# Patient Record
(demographics unavailable — no encounter records)

---

## 2018-06-07 NOTE — XRAY REPORT
PELVIS AND RIGHT HIP:  06/07/2018

 

HISTORY:  Pain.

 

COMPARISON:  Pelvic CT 03/01/2010.

 

FINDINGS:  The hip joints are well maintained.  There is no fracture or malalignment.  

Sacroiliac joints and symphysis pubis are unremarkable.  Mild degenerative change in the lower 

lumbar spine.

 

IMPRESSION:  ESSENTIALLY NEGATIVE PELVIS AND RIGHT HIP X-RAY.

 

 

DD: 06/07/2018 14:26

TD: 06/07/2018 14:31

Job #: [id number]

## 2018-11-27 NOTE — ULTRASOUND REPORT
Reason:  CVA with right occipital infarction

Procedure Date:  11/27/2018   

Accession Number:  [id number] / [id number]                    

Procedure:  US  - Carotid Doppler Complete CPT Code:  

 

FULL RESULT:

 

 

EXAM:

BILATERAL CAROTID AND VERTEBRAL ARTERY DUPLEX DOPPLER ULTRASOUND:

 

EXAM DATE: 11/27/2018 01:33 PM

 

CLINICAL HISTORY: Stroke.

 

COMPARISON: None.

 

TECHNIQUE: Grayscale imaging, color Doppler, and duplex spectral Doppler 

were used to evaluate the carotid and vertebral arteries bilaterally. 

Static images were obtained.

 

FINDINGS: No significant plaque is identified in the right or left common 

or internal carotid arteries. Normal antegrade flow is present in 

bilateral vertebral arteries.

 

VELOCITIES (cm/sec):

 

Right:

RCCA Mid: PSV 61.6 cm/sec.

RCCA Dist: PSV 50.4 cm/sec.

MARIAN Prox: PSV 47.6 cm/sec, EDV 18.7 cm/sec.

MARIAN Mid: PSV 75.5 cm/sec, EDV 27.7 cm/sec.

MARIAN Dist: PSV 90.4 cm/sec, EDV 36.5 cm/sec

RECA: .6 cm/sec.

RVA: PSV 34.4 cm/sec. RVA flow direction: Antegrade.

ICA/CCA: 1.47

 

Left:

LCCA Mid: PSV 55.5 cm/sec.

LCCA Dist: PSV 58.5 cm/sec.

LICA Prox: PSV 58.8 cm/sec, EDV 25.8 cm/sec.

LICA Mid: PSV 82.3 cm/sec, EDV 31.9 cm/sec.

LICA Dist: PSV 60.5 cm/sec, EDV 21.8 cm/sec

LECA: PSV 49.3 cm/sec.

LVA: PSV 48.7 cm/sec. RVA flow direction: Antegrade.

ICA/CCA: 1.40

 

ICA diameter stenosis:

Right: <50% by velocity and <70% by NASCET criteria.

Left: <50% by velocity and <70% by NASCET criteria.

IMPRESSION:

1. There is mild bilateral carotid artery plaquing.

2. In the right carotid artery there are no elevated carotid artery 

velocities to suggest hemodynamically significant stenosis.

3. In the left carotid artery there are no elevated carotid artery 

velocities to suggest hemodynamically significant stenosis.

4. Normal antegrade flow is present in bilateral vertebral arteries.

 

General Recommendations:

 

Stenosis =50% ICA - Follow-up ultrasound 6-12 months

Stenosis <50% ICA - High Risk Patient with plaque - Follow-up ultrasound 

1-2 years

Normal Study but High Risk Patient - Follow-up ultrasound 3-5 years

 

Management recommendations and diagnostic criteria are based on current 

IAC endorsed standards in Carotid Artery Stenosis: Grayscale and Doppler 

Ultrasound Diagnosis.

Validated velocity measurements with angiographic measurements and 

velocity criteria are extrapolated from diameter data as defined by the 

Society of Radiologists in Ultrasound Consensus Conference Radiology 

2003; 229;340-346.

 

[facility]IA

## 2018-11-27 NOTE — ED PHYSICIAN DOCUMENTATION
History of Present Illness





- Stated complaint


Stated Complaint: VISION CHANGES/ALOC





- Chief complaint


Chief Complaint: Neuro





- History obtained from


History obtained from: Patient, Family





- History of Present Illness


Timing: How many weeks ago (1)





- Additonal information


Additional information: 





intermittent visual changes with difficulty concentrating and a lot of stress at

home. She reports difficulty concentrating and especially trying to read. 





Review of Systems


Constitutional: reports: Fatigue.  denies: Fever, Chills, Myalgias


Eyes: reports: Other (difficutly with reading/concentration).  denies: Loss of 

vision, Decreased vision, Photophobia, Discharge, Irritation


Nose: denies: Rhinorrhea / runny nose, Congestion


Throat: denies: Sore throat


Cardiac: denies: Chest pain / pressure, Palpitations


Respiratory: denies: Dyspnea, Cough


GI: denies: Abdominal Pain, Nausea, Vomiting


: denies: Dysuria, Frequency


Skin: denies: Rash


Musculoskeletal: denies: Neck pain, Back pain, Extremity pain


Neurologic: reports: Confused.  denies: Generalized weakness, Focal weakness, 

Numbness





PD PAST MEDICAL HISTORY





- Present Medications


Home Medications: 


                                Ambulatory Orders











 Medication  Instructions  Recorded  Confirmed


 


Atorvastatin [Lipitor] 20 mg PO DAILY #30 tablet 11/27/18 


 


Ipratropium/Albuterol [Duoneb] QID PRN 11/27/18 














- Allergies


Allergies/Adverse Reactions: 


                                    Allergies











Allergy/AdvReac Type Severity Reaction Status Date / Time


 


No Known Drug Allergies Allergy   Verified 11/27/18 08:53














PD ED PE NORMAL





- Vitals


Vital signs reviewed: Yes (hypertensive)





- General


General: Alert and oriented X 3, No acute distress, Well developed/nourished





- HEENT


HEENT: Atraumatic, PERRL, EOMI, Moist mucous membranes, Pharynx benign, 

Dentition benign





- Neck


Neck: Supple, no meningeal sign, No bony TTP





- Cardiac


Cardiac: RRR, No murmur





- Respiratory


Respiratory: No respiratory distress, Clear bilaterally





- Abdomen


Abdomen: Soft, Non tender





- Back


Back: No CVA TTP, No spinal TTP





- Derm


Derm: Normal color, Warm and dry, No rash





- Extremities


Extremities: No deformity, No edema





- Neuro


Neuro: Alert and oriented X 3, CNs 2-12 intact, No motor deficit, No sensory 

deficit, Normal speech


Eye Opening: Spontaneous


Motor: Obeys Commands


Verbal: Oriented


GCS Score: 15





- Psych


Psych: Normal mood, Normal affect





Results





- Vitals


Vitals: 


                               Vital Signs - 24 hr











  11/27/18





  08:47


 


Temperature 36.6 C


 


Heart Rate 84


 


Respiratory 18





Rate 


 


Blood Pressure 195/98 H


 


O2 Saturation 96








                                     Oxygen











O2 Source                      Room air

















- EKG (time done)


  ** 1435


Rate: Rate (enter#) (64)


Rhythm: NSR


Ischemia: Q waves


Compare to prior EKG: Old EKG unavailable


Computer interpretation: Agree with computer





- Labs


Labs: 


                                Laboratory Tests











  11/27/18 11/27/18 11/27/18





  10:32 10:32 10:32


 


WBC  6.4  


 


RBC  4.13 L  


 


Hgb  13.8  


 


Hct  39.0  


 


MCV  94.5  


 


MCH  33.3 H  


 


MCHC  35.2  


 


RDW  13.6  


 


Plt Count  476 H  


 


MPV  7.2 L  


 


Neut # (Auto)  3.6  


 


Lymph # (Auto)  2.1  


 


Mono # (Auto)  0.5  


 


Eos # (Auto)  0.1  


 


Baso # (Auto)  0.2 H  


 


Absolute Nucleated RBC  0.00  


 


Nucleated RBC %  0.1  


 


Sodium   134 L 


 


Potassium   3.6 


 


Chloride   98 L 


 


Carbon Dioxide   28 


 


Anion Gap   8.0 


 


BUN   8 


 


Creatinine   0.6 


 


Estimated GFR (MDRD)   100 


 


Glucose   108 H 


 


Calcium   8.4 L 


 


Total Bilirubin   0.4 


 


AST   21 


 


ALT   18 


 


Alkaline Phosphatase   66 


 


Troponin I    < 0.04


 


Total Protein   6.6 L 


 


Albumin   3.5 


 


Globulin   3.1 


 


Albumin/Globulin Ratio   1.1 


 


Lipase   38 


 


Urine Color   


 


Urine Clarity   


 


Urine pH   


 


Ur Specific Gravity   


 


Urine Protein   


 


Urine Glucose (UA)   


 


Urine Ketones   


 


Urine Occult Blood   


 


Urine Nitrite   


 


Urine Bilirubin   


 


Urine Urobilinogen   


 


Ur Leukocyte Esterase   


 


Ur Microscopic Review   


 


Urine Culture Comments   














  11/27/18





  12:19


 


WBC 


 


RBC 


 


Hgb 


 


Hct 


 


MCV 


 


MCH 


 


MCHC 


 


RDW 


 


Plt Count 


 


MPV 


 


Neut # (Auto) 


 


Lymph # (Auto) 


 


Mono # (Auto) 


 


Eos # (Auto) 


 


Baso # (Auto) 


 


Absolute Nucleated RBC 


 


Nucleated RBC % 


 


Sodium 


 


Potassium 


 


Chloride 


 


Carbon Dioxide 


 


Anion Gap 


 


BUN 


 


Creatinine 


 


Estimated GFR (MDRD) 


 


Glucose 


 


Calcium 


 


Total Bilirubin 


 


AST 


 


ALT 


 


Alkaline Phosphatase 


 


Troponin I 


 


Total Protein 


 


Albumin 


 


Globulin 


 


Albumin/Globulin Ratio 


 


Lipase 


 


Urine Color  DARK YELLOW


 


Urine Clarity  CLEAR


 


Urine pH  6.5


 


Ur Specific Gravity  1.020


 


Urine Protein  NEGATIVE


 


Urine Glucose (UA)  NEGATIVE


 


Urine Ketones  TRACE


 


Urine Occult Blood  NEGATIVE


 


Urine Nitrite  NEGATIVE


 


Urine Bilirubin  NEGATIVE


 


Urine Urobilinogen  0.2 (NORMAL)


 


Ur Leukocyte Esterase  NEGATIVE


 


Ur Microscopic Review  NOT INDICATED


 


Urine Culture Comments  NOT INDICATED














- Rads (name of study)


  ** CT head without


Radiology: Prelim report reviewed (Impression: Posterior distribution 

hypodensity in the right temporoparietal/occipital distribution is most 

concerning for subacute infarction though a nonvisualized mass with surrounding 

edema represents a lice likely differential diagnosis.  There is no substantial 

mass-effect or calcification to suggest an underlying mass.), Final report rece

ived, Discussed with rads, EMP read indepedently, See rad report





  ** doppler carotid


Radiology: Prelim report reviewed (Impression: 1.  There is mild bilateral 

carotid artery plaquing. 2  In the right carotid artery there are no elevated 

carotid artery velocities to suggest hemodynamically significant stenosis. 3 In 

the left carotid artery there are no elevated carotid artery velocities to 

suggest hemodynamically significant stenosis. 4  Normal antegrade flow is 

present in bilateral vertebral arteries.), EMP read indepedently, See rad report





Procedures





- IVC sono (time)


  ** 1008


Bedside IVC sono: IVC measures (cm) (1.39), IVC collapsed c insp (cm) 

(complete), Dehydration (mild est at 1 liter.)





PD MEDICAL DECISION MAKING





- ED course


Complexity details: reviewed results, re-evaluated patient, considered 

differential, d/w patient, d/w family


ED course: 





67 y/o female with one week history of "vision problems" with trouble 

concentrating and difficulty reading appears to have had a CVA in the occipital 

distribution. She is outside the window of opportunity. My initial reaction was 

to admit this patient to the hospital to complete a stroke work up and after 

discussing this completed stroke with our hospitalist, Charity recommended 

obtaining the carotid doppler here today and the remainder of the work up as an 

outpatient and of course treatment with aspirin and a statin. I was in agreement

 with this approach and the patient preferred this as well. She does have a 

primary in our system and out patient work up should not be delayed to include 

the echo and MRI of the head. She will need referral to a neurologist for follow

 up as well. 





Departure





- Departure


Disposition: 01 Home, Self Care


Clinical Impression: 


Cerebrovascular accident (CVA)


Qualifiers:


 CVA mechanism: unspecified Qualified Code(s): I63.9 - Cerebral infarction, 

unspecified





Condition: Stable


Instructions:  ED Stroke Completed


Follow-Up: 


Venancio Lazaro MD [Primary Care Provider] - 


Prescriptions: 


Atorvastatin [Lipitor] 20 mg PO DAILY #30 tablet


Forms:  Activity restrictions

## 2018-11-27 NOTE — CT REPORT
Reason:  vision changes/disorientation

Procedure Date:  11/27/2018   

Accession Number:  [id number] / [id number]                    

Procedure:  CT  - Head W/O CPT Code:  

 

FULL RESULT:

 

 

EXAM:

CT HEAD WITHOUT CONTRAST

 

EXAM DATE: 11/27/2018 10:43 AM.

 

CLINICAL HISTORY: Vision changes/disorientation.

 

COMPARISON: None.

 

TECHNIQUE: Multiaxial CT images were obtained from the foramen magnum to 

the vertex. Reformats: Sagittal and coronal. IV contrast: None.

 

In accordance with CT protocol optimization, one or more of the following 

dose reduction techniques were utilized for this exam: automated exposure 

control, adjustment of mA and/or KV based on patient size, or use of 

iterative reconstructive technique.

 

FINDINGS:

Parenchyma: Hypodensity in a right posterior parietotemporal-occipital 

distribution is suspicious for subacute infarction. No intraparenchymal 

hemorrhage. No evidence of significant mass or midline shift. Gray-white 

differentiation is distinct.

 

Extraaxial Spaces: Normal for age. No subdural or epidural collections 

identified.

 

Ventricles: Normal in size and position.

 

Sinuses and Orbits: Imaged paranasal sinuses, orbits, and mastoids show 

no significant abnormality.

 

Bones: No evidence of fracture or calvarial defect.

 

Other: None.

IMPRESSION: Posterior distribution hypodensity in the right 

temporoparietal-occipital distribution is most concerning for subacute 

infarction though a nonvisualized mass with surrounding edema represents 

a less likely differential diagnosis. There is no substantial mass effect 

or calcification to suggest an underlying mass.

 

[facility] CRITICAL RESULT: The findings were discussed with Dr. Perez on 

11/27/2018 at 11:50 AM.

## 2020-03-17 NOTE — XRAY REPORT
Reason:  COUGH, COPD

Procedure Date:  03/17/2020   

Accession Number:  [id number] / [id number]                    

Procedure:  WCP - Chest 2 View X-Ray CPT Code:  07896

 

***Final Report***

 

 

FULL RESULT:

 

 

EXAM:

CHEST RADIOGRAPHY

 

EXAM DATE: 3/17/2020 03:09 PM.

 

CLINICAL HISTORY: Cough, COPD.

 

COMPARISON: XR CHEST PA AND LAT 05/31/2011 3:20 PM.

 

TECHNIQUE: 2 views.

 

FINDINGS:

Lungs/Pleura: Hyperinflation. Development of infiltrate along the lateral 

right upper and lower lung, with infiltrate along the major fissure and 

the posterior costophrenic angle.

 

The left lung appears clear. Hyperinflation. No pleural fluid collections.

 

Mediastinum: Heart and mediastinal contours are unremarkable.

 

Other: None.

IMPRESSION: Development of lateral right sided infiltrate. Followup to 

resolution suggested.

 

[facility]

## 2020-03-20 NOTE — XRAY REPORT
Reason:  COUGH,DIVERTICULOSIS,ASYMPTOMATIC

Procedure Date:  03/20/2020   

Accession Number:  [id number] / [id number]                    

Procedure:  XR  - Chest 2 View X-Ray CPT Code:  70144

 

***Final Report***

 

 

FULL RESULT:

 

 

EXAM:

CHEST RADIOGRAPHY

 

EXAM DATE: 3/20/2020 09:10 AM.

 

CLINICAL HISTORY: Cough, diverticulosis, asymptomatic.

 

COMPARISON: CHEST 2 VIEW 03/17/2020 2:34 PM.

 

TECHNIQUE: 2 views.

 

FINDINGS:

Lungs/Pleura: No significant change in areas of dense consolidation 

predominantly in the lateral right upper lobe and lateral segment of the 

right middle lobe.

 

Development of a subtle area of opacification laterally in the left upper 

lobe. Stable moderate hyperinflation. No pleural fluid collections.

 

Mediastinum: No cardiomegaly. Stable appearance of the mediastinal 

silhouette.

 

Other: None.

IMPRESSION:

No significant change in areas of dense consolidation laterally in the 

right lung. Subtle infiltrative process also suggested laterally in the 

left midlung field. Follow-up to resolution suggested.

 

[facility]

## 2020-05-13 NOTE — CT REPORT
Reason:  L sided facial droop, L neck pain

Procedure Date:  05/13/2020   

Accession Number:  [id number] / [id number]                    

Procedure:  CT  - ANGIO NECK W CPT Code:  

 

***Final Report***

 

 

FULL RESULT:

 

 

EXAM:

CT ANGIOGRAM NECK

 

EXAM DATE: 05/13/2020 07:09 AM.

 

CLINICAL HISTORY: Headache, nausea and left facial droop. Neck pain.

 

COMPARISON: None.

 

TECHNIQUE: Routine axial helical imaging was performed from the skull 

base through the aortic arch. Reconstructions: Routine multiplanar 3D MIP 

reconstructions. IV Contrast: 80 mL Optiray 320. Evaluation of arterial 

stenosis is based on a NASCET method of measurement.

 

In accordance with CT protocol optimization, one or more of the following 

dose reduction techniques were utilized for this exam: automated exposure 

control, adjustment of mA and/or KV based on patient size, or use of 

iterative reconstructive technique.

 

FINDINGS:

Probable mild apical pulmonary parenchymal scarring with minimal 

emphysema. Moderate to severe chronic multilevel hypertrophic 

degenerative cervical spinal spondylosis including especially severe 

facet arthropathy on the left at C4-C5. Mild broad-based cervical 

dextroscoliosis. Minimal degenerative C4 on C5 anterolisthesis. Slight 

degenerative C5 on C6 retrolisthesis is also present.

 

Along the right side of the upper mediastinal esophagus is an ovoid 

region of soft tissue density measuring up to 11 x 19 mm transverse on 

image 130 of series 2, likely an abnormally enlarged lymph node.

 

Mild to moderate chronic atherosclerotic calcifications at the top of the 

aortic arch. The great vessel origins are patent.

 

Mild to moderate atherosclerosis at the cervical carotid bifurcations. 

Allowing for motion artifact there is, however, no evidence of acute 

abnormality or focal flow-limiting stenosis of the cervical carotid 

arteries, including in the proximal cervical ICA segments.

 

Unremarkable appearance of the right cervical vertebral artery.

 

Mild atherosclerotic luminal stenosis at the origin of the left cervical 

vertebral artery which shows no other evidence of flow-limiting stenosis 

or acute abnormality in the neck.

IMPRESSION:

1. Multifocal large-artery atherosclerosis with calcified and 

noncalcified plaque but no evidence of acute abnormality or flow-limiting 

stenosis of the cervical vertebral or carotid arteries.

2. Abnormally enlarged lymph node in the upper mediastinum on the right 

side of the esophagus.

3. Prominent chronic multilevel hypertrophic degenerative cervical spinal 

spondylosis. Especially severe hypertrophic facet arthropathy on the left 

at C4-C5.

 

RAD[facility]

## 2020-05-13 NOTE — CT REPORT
Reason:  L sided facial droop

Procedure Date:  05/13/2020   

Accession Number:  [id number] / [id number]                    

Procedure:  CT  - ANGIO HEAD W/WO CPT Code:  

 

***Final Report***

 

 

FULL RESULT:

 

 

EXAM:

CT ANGIOGRAM HEAD.

CT SCAN OF THE HEAD WITHOUT AND WITH CONTRAST.

 

EXAM DATE: 5/13/2020 07:09 AM

 

CLINICAL HISTORY: Left facial droop, headache and nausea.

 

COMPARISON: HEAD W/O 11/27/2018 10:28 AM.

 

TECHNIQUE:

- CT Scan Head: Using a multidetector scanner, axial images were acquired 

from the foramen magnum to the skull vertex prior to and following 

contrast administration.

- CT Angiogram: Using a multidetector scanner, high-resolution axial 

images were acquired from the skull base through vertex following rapid 

infusion of intravenous contrast. Reformats: Multiplanar MIP reformats 

were reconstructed. NASCET criteria used for stenosis measurement.

 

IV Contrast: 80 mL Optiray 320.

 

In accordance with CT protocol optimization, one or more of the following 

dose reduction techniques were utilized for this exam: automated exposure 

control, adjustment of mA and/or KV based on patient size, or use of 

iterative reconstructive technique.

 

FINDINGS:

Brain CT without IV contrast:

Again seen are findings of a now large chronic infarct in the right PCA 

vascular territory involving the medial right occipital lobe. This has a 

chronic appearance and now measures about 6 x 2.5 cm transverse.

 

No CT evidence of acute hemorrhage or acute ischemic infarct, aspects 10. 

No hydrocephalus or midline shift.

 

Probable mild inferior right mastoid mucosal thickening. Small left 

anterior maxillary retention cyst. No acute calvarial defect.

 

Brain CT with IV contrast:

No abnormal enhancement.

 

Head CT angiogram:

Mild chronic punctate calcifications of the left intradural vertebral 

artery. No vertebrobasilar insufficiency. No acute abnormality or 

flow-limiting stenosis of the intradural vertebral arteries or basilar 

artery.

 

More likely chronic than acute segmental occlusion of the proximal right 

posterior cerebral artery, on the same side as the large old right PCA 

territory infarct.

 

Patent distal internal carotid arteries with moderately prominent 

multifocal chronic atherosclerotic calcifications of the proximal pair 

clinoid and cavernous ICA segments.

 

No proximal flow-limiting stenosis or occlusion of the anterior cerebral 

arteries, middle cerebral arteries or left posterior cerebral artery. No 

evidence for aneurysm of the [place] or major dural venous sinus 

obstructive thrombus.

IMPRESSION:

CT Head:

1. No CT evidence of acute intracranial abnormality.

2. Large old right PCA territory infarct of the medial right occipital 

lobe.

3. No abnormal enhancement.

 

CTA Head:

1. More likely chronic than acute segmental occlusion of the proximal 

right PCA on the same side as previous infarct.

2. No other evidence for intracranial acute large vessel occlusion or 

aneurysm.

 

Note: The above findings do not preclude the possibility of small or 

early ischemic infarct for which brain MRI is more sensitive.

 

RADIA

## 2020-05-13 NOTE — ED PHYSICIAN DOCUMENTATION
PD HPI HEADACHE





- Stated complaint


Stated Complaint: POSS STROKE





- Chief complaint


Chief Complaint: General





- History obtained from


History obtained from: Patient, EMS





- History of Present Illness


Timing - onset: Enter  time (610), Today


Timing - onset during: Light activity


Timing - duration: Minutes


Timing - details: Abrupt onset, Still present


Location: [facility]


Quality: Throbbing


Associated symptoms: Nausea, Vision changes.  No: Fever, Stiff neck, Vomiting, 

Weakness, Numbness, Syncope, Seizure, Eye pain


Improved by: Rest


Contributing factors: Hypertension.  No: Anticoagulated, Possible carbon 

monoxide, Recent illness, Trauma


Similar symptoms before: Diagnosis (CAV with resultant hemianopsia)


Recently seen: Not recently seen





- Additional information


Additional information: 


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 

hemianopsia was driving on her way to work this morning when she developed 

bright flashing lights in the periphery of her vision and subsequent to that she

developed nausea and a headache she felt confused she pulled over onto the side 

of the road and was brought to the hospital by ambulance.








Review of Systems


Constitutional: denies: Fever, Chills, Myalgias, Fatigue


Eyes: reports: Loss of vision (to the left lateral visual field-- pre existing),

Photophobia (mild)


Ears: denies: Ear pain


Nose: denies: Rhinorrhea / runny nose, Congestion


Throat: denies: Sore throat


Cardiac: denies: Chest pain / pressure, Palpitations


Respiratory: denies: Dyspnea, Cough


GI: reports: Nausea.  denies: Abdominal Pain, Vomiting


: denies: Dysuria, Frequency


Skin: denies: Rash


Musculoskeletal: denies: Neck pain, Back pain, Extremity pain


Neurologic: reports: Altered mental status, Headache.  denies: Generalized 

weakness, Focal weakness, Numbness, Difficulty speaking, Head injury, LOC





PD PAST MEDICAL HISTORY





- Past Medical History


Past Medical History: Yes


Respiratory: COPD


Neuro: CVA


Endocrine/Autoimmune: None


GI: Diverticulitis


GYN: None


: None


HEENT: Other


Psych: None


Musculoskeletal: None


Derm: None





- Past Surgical History


Past Surgical History: Yes


General: Appendectomy


/GYN:  section


HEENT: Cataracts, Tonsil/Adenoidectomy





- Present Medications


Home Medications: 


                                Ambulatory Orders











 Medication  Instructions  Recorded  Confirmed


 


Atorvastatin [Lipitor] 20 mg PO DAILY #30 tablet 18 


 


Ipratropium/Albuterol [Duoneb] QID PRN 18 


 


Levetiracetam [Keppra] 250 mg PO BID #40 tablet 20 














- Allergies


Allergies/Adverse Reactions: 


                                    Allergies











Allergy/AdvReac Type Severity Reaction Status Date / Time


 


No Known Drug Allergies Allergy   Verified 20 06:37














- Social History


Does the pt smoke?: Yes


Smoking Status: Current every day smoker


Does the pt drink ETOH?: Yes


Does the pt have substance abuse?: No





- Immunizations


Immunizations are current?: Yes





- POLST


Patient has POLST: No





PD ED PE NORMAL





- Vitals


Vital signs reviewed: Yes (hypertensive)





- General


General: Alert and oriented X 3, No acute distress, Well developed/nourished





- HEENT


HEENT: Atraumatic, PERRL, EOMI





- Neck


Neck: Supple, no meningeal sign, No bony TTP





- Cardiac


Cardiac: RRR, No murmur





- Respiratory


Respiratory: No respiratory distress, Clear bilaterally





- Abdomen


Abdomen: Normal bowel sounds, Soft, Non tender, Non distended, No organomegaly





- Back


Back: No CVA TTP, No spinal TTP





- Derm


Derm: Normal color, Warm and dry, No rash





- Extremities


Extremities: No deformity, No edema





- Neuro


Neuro: Alert and oriented X 3, CNs 2-12 intact, No motor deficit, Normal speech,

 Other (There is a left hemianopsia)


Eye Opening: Spontaneous


Motor: Obeys Commands


Verbal: Oriented


GCS Score: 15





- Psych


Psych: Normal affect, Other (mood is anxious)





Results





- Vitals


Vitals: 


                               Vital Signs - 24 hr











  20





  06:32 06:37 06:50


 


Temperature 37.3 C  


 


Heart Rate 83  


 


Respiratory 17 17 18





Rate   


 


Blood Pressure 174/81 H  


 


O2 Saturation 94  














  20





  07:09 07:57 09:01


 


Temperature  36.8 C 


 


Heart Rate  80 106 H


 


Respiratory 17 18 17





Rate   


 


Blood Pressure  152/71 H 152/72 H


 


O2 Saturation  95 98














  20





  11:18 13:40


 


Temperature  


 


Heart Rate 93 93


 


Respiratory 18 18





Rate  


 


Blood Pressure 140/73 H 140/73 H


 


O2 Saturation 99 99








                                     Oxygen











O2 Source                      Room air

















- EKG (time done)


  ** 0644


Rate: Rate (enter#) (82)


Rhythm: NSR


Ischemia: Normal ST segments


Compare to prior EKG: Unchanged from prior EKG (SPT 2018)


Computer interpretation: Agree with computer





- Labs


Labs: 


                                Laboratory Tests











  20





  07:22 07:22 07:22


 


WBC  7.7  


 


RBC  3.98 L  


 


Hgb  12.3  


 


Hct  38.1  


 


MCV  95.7  


 


MCH  30.9  


 


MCHC  32.3  


 


RDW  14.3  


 


Plt Count  259  


 


MPV  9.2  


 


Neut # (Auto)  5.7  


 


Lymph # (Auto)  1.4 L  


 


Mono # (Auto)  0.5  


 


Eos # (Auto)  0.2  


 


Baso # (Auto)  0.1  


 


Absolute Nucleated RBC  0.00  


 


Nucleated RBC %  0.0  


 


PT   11.3 


 


INR   1.0 


 


APTT   28.3 


 


Sodium    132 L


 


Potassium    4.2


 


Chloride    101


 


Carbon Dioxide    25


 


Anion Gap    6.0


 


BUN    14


 


Creatinine    0.5


 


Estimated GFR (MDRD)    123


 


Glucose    118 H


 


Calcium    8.3 L


 


Urine Color   


 


Urine Clarity   


 


Urine pH   


 


Ur Specific Gravity   


 


Urine Protein   


 


Urine Glucose (UA)   


 


Urine Ketones   


 


Urine Occult Blood   


 


Urine Nitrite   


 


Urine Bilirubin   


 


Urine Urobilinogen   


 


Ur Leukocyte Esterase   


 


Ur Microscopic Review   


 


Urine Culture Comments   














  20





  08:45


 


WBC 


 


RBC 


 


Hgb 


 


Hct 


 


MCV 


 


MCH 


 


MCHC 


 


RDW 


 


Plt Count 


 


MPV 


 


Neut # (Auto) 


 


Lymph # (Auto) 


 


Mono # (Auto) 


 


Eos # (Auto) 


 


Baso # (Auto) 


 


Absolute Nucleated RBC 


 


Nucleated RBC % 


 


PT 


 


INR 


 


APTT 


 


Sodium 


 


Potassium 


 


Chloride 


 


Carbon Dioxide 


 


Anion Gap 


 


BUN 


 


Creatinine 


 


Estimated GFR (MDRD) 


 


Glucose 


 


Calcium 


 


Urine Color  YELLOW


 


Urine Clarity  CLEAR


 


Urine pH  6.0


 


Ur Specific Gravity  1.010


 


Urine Protein  NEGATIVE


 


Urine Glucose (UA)  NEGATIVE


 


Urine Ketones  NEGATIVE


 


Urine Occult Blood  NEGATIVE


 


Urine Nitrite  NEGATIVE


 


Urine Bilirubin  NEGATIVE


 


Urine Urobilinogen  0.2 (NORMAL)


 


Ur Leukocyte Esterase  NEGATIVE


 


Ur Microscopic Review  NOT INDICATED


 


Urine Culture Comments  NOT INDICATED














- Rads (name of study)


  ** CTA head


Radiology: Prelim report reviewed (1.  More likely chronic than acute segmental 

occlusion of the proximal right PCA on the same side as previous infarct.  2. No

 other evidence of for intracranial acute large vessel occlusion or aneurysm.), 

EMP read indepedently, See rad report





  ** CTA neck


Radiology: Prelim report reviewed (Impression: 1.  Multifocal large artery 

atherosclerosis with calcified and noncalcified plaque but no evidence of acute 

abnormality or flow-limiting stenosis of the cervical vertebral or carotid 

arteries. 2 abnormally enlarged lymph node in the upper mediastinum on the right

 side of the esophagus. 3 Prominent chronic multilevel hypertrophic degenerative

 cervical spine spondylosis especially severe hypertrophic facet arthropathy at 

the left at C5 C4), EMP read indepedently, See rad report





  ** CT head


Radiology: Prelim report reviewed (Impression: 1.  No acute CT evidence of 

intracranial abnormality.  2. Large old right PCA territory infarct of the 

medial right occipital lobe.  3. No abnormal enhancement.)





PD MEDICAL DECISION MAKING





- ED course


Complexity details: reviewed old records, reviewed results, re-evaluated 

patient, considered differential, d/w patient


ED course: 





67-year-old female with a prior CVA resulting in a left hemianopsia has 

developed symptoms in route to the hospital this morning with some flashing 

lights and confusion as well as a headache and nausea is treated here in the 

emergency department with a migraine cocktail has some improvement in her 

headache but has persistent symptoms of tingling and weakness.  She still does 

not feel right.  Her CTA of the head and neck today are concerning for a large 

vessel occlusion. The neurologist Dr. Kirstie Callejas at [facility] is 

consulted in the case and after review of the history is concerned for the 

possibility of seizure versus migraine and she recommends loading with Keppra 

and a follow-up with her neurologist. We were able to contact the patient's 

primary and were able to get that her neurologist was Dr. Murray Peguero of [facility].  He has seen the patient once in [place] and his recommendation was 

to get prolonged monitoring for the suspicion of A. fib.  The patient indicates 

she did have monitoring done and was placed on metoprolol by the cardiologist 

for rapid heart rate.  She did not have atrial fibrillation.  Dr. Peguero was 

mostly concerned about prolonged monitoring as the most likely culprit in the 

type of clot that she is exhibiting is atrial fibrillation.  He has recommended 

a dosing regimen for the Keppra to be 250 mg twice daily followed by 500 twice 

daily and a follow-up with him.  He recommends patient not drive at this time.





Departure





- Departure


Disposition: 01 Home, Self Care


Clinical Impression: 


 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





Condition: Stable


Instructions:  ED Seizure New Onset Unk Cause


Follow-Up: 


Venancio Lazaro MD [Primary Care Provider] - 


Murray Peguero MD [Physician No Access] - 


Prescriptions: 


Levetiracetam [Keppra] 250 mg PO BID #40 tablet


Comments: 


Today it appears that the episode you have is related to the prior stroke and 

likely represents a seizure.  For this reason it is important that you not drive

 for the time being until you are cleared by Dr. Peguero.  He has recommended you

 take Keppra 250 mg twice per day for a week followed by 500 mg twice per day 

and a follow-up with him.  He would like you to have an extended period of 

monitoring to look for atrial fibrillation.  This should be set up by your 

primary care doctor and through your cardiologist.

## 2020-05-14 NOTE — ED PHYSICIAN DOCUMENTATION
ED Addendum





- Addendum


Addendum: 





05/14/20 16:22


I received a call from provider in [place] asking for a review and vaccine to 

him an EKG on this patient.  She presented today in [place] with chest pain and 

had EKG showing T wave inversions anteriorly V1 through V4 and an elevated 

troponin and he wanted a comparison view.  I faxed both EKG and the ER record to

him at the number he provided.  This was done through the [facility].